# Patient Record
Sex: MALE | Race: BLACK OR AFRICAN AMERICAN | NOT HISPANIC OR LATINO | URBAN - METROPOLITAN AREA
[De-identification: names, ages, dates, MRNs, and addresses within clinical notes are randomized per-mention and may not be internally consistent; named-entity substitution may affect disease eponyms.]

---

## 2022-01-13 ENCOUNTER — WEB ENCOUNTER (OUTPATIENT)
Dept: URBAN - METROPOLITAN AREA CLINIC 82 | Facility: CLINIC | Age: 62
End: 2022-01-13

## 2022-01-13 ENCOUNTER — LAB OUTSIDE AN ENCOUNTER (OUTPATIENT)
Dept: URBAN - METROPOLITAN AREA CLINIC 82 | Facility: CLINIC | Age: 62
End: 2022-01-13

## 2022-01-13 ENCOUNTER — OFFICE VISIT (OUTPATIENT)
Dept: URBAN - METROPOLITAN AREA CLINIC 82 | Facility: CLINIC | Age: 62
End: 2022-01-13
Payer: MEDICARE

## 2022-01-13 VITALS
SYSTOLIC BLOOD PRESSURE: 149 MMHG | HEART RATE: 81 BPM | HEIGHT: 64 IN | DIASTOLIC BLOOD PRESSURE: 84 MMHG | TEMPERATURE: 97.4 F | WEIGHT: 150.8 LBS | BODY MASS INDEX: 25.74 KG/M2

## 2022-01-13 DIAGNOSIS — N18.6 ESRD (END STAGE RENAL DISEASE): ICD-10-CM

## 2022-01-13 DIAGNOSIS — K62.5 RECTAL BLEEDING: ICD-10-CM

## 2022-01-13 DIAGNOSIS — Z86.2 HISTORY OF IRON DEFICIENCY ANEMIA: ICD-10-CM

## 2022-01-13 PROBLEM — 46177005: Status: ACTIVE | Noted: 2022-01-13

## 2022-01-13 PROCEDURE — 99204 OFFICE O/P NEW MOD 45 MIN: CPT | Performed by: INTERNAL MEDICINE

## 2022-01-13 RX ORDER — CALCITRIOL 0.5 UG/1
TAKE 1 CAPSULE (0.5 MCG) BY ORAL ROUTE ONCE DAILY CAPSULE, LIQUID FILLED ORAL 1
Qty: 0 | Refills: 0 | Status: ACTIVE | COMMUNITY
Start: 1900-01-01

## 2022-01-13 RX ORDER — CLONIDINE HYDROCHLORIDE 0.2 MG/1
TAKE 1 TABLET (0.2 MG) BY ORAL ROUTE ONCE DAILY TABLET ORAL 1
Qty: 0 | Refills: 0 | Status: ACTIVE | COMMUNITY
Start: 1900-01-01

## 2022-01-13 RX ORDER — ASPIRIN 81 MG/1
TAKE 1 TABLET (81 MG) BY ORAL ROUTE ONCE DAILY TABLET, COATED ORAL 1
Qty: 0 | Refills: 0 | Status: ACTIVE | COMMUNITY
Start: 1900-01-01

## 2022-01-13 RX ORDER — NIFEDIPINE 90 MG/1
TAKE 1 TABLET (90 MG) BY ORAL ROUTE ONCE DAILY TABLET, EXTENDED RELEASE ORAL 1
Qty: 0 | Refills: 0 | Status: ACTIVE | COMMUNITY
Start: 1900-01-01

## 2022-01-13 RX ORDER — HYDRALAZINE HYDROCHLORIDE 100 MG/1
TABLET ORAL
Qty: 0 | Refills: 0 | Status: ACTIVE | COMMUNITY
Start: 1900-01-01

## 2022-01-13 RX ORDER — SODIUM BICARBONATE 650 MG/1
TABLET ORAL
Qty: 0 | Refills: 0 | Status: ACTIVE | COMMUNITY
Start: 1900-01-01

## 2022-01-13 RX ORDER — LOSARTAN POTASSIUM 50 MG/1
TAKE 1 TABLET (50 MG) BY ORAL ROUTE ONCE DAILY TABLET ORAL 1
Qty: 0 | Refills: 0 | Status: ACTIVE | COMMUNITY
Start: 1900-01-01

## 2022-01-13 RX ORDER — LABETALOL HYDROCHLORIDE 200 MG/1
TAKE 1 TABLET (200 MG) BY ORAL ROUTE 2 TIMES PER DAY TABLET, FILM COATED ORAL 2
Qty: 0 | Refills: 0 | Status: ACTIVE | COMMUNITY
Start: 1900-01-01

## 2022-01-13 NOTE — HPI-TODAY'S VISIT:
DEJUAN WADE IS HERS  PASSING BLOOD IN STOOL , FOR 1 WEEKS. CHRONIC CONSTIPATION  NO ABDOMINAL PAIN,   WT LOSS 5-6 LBS IN 2 MONTHS  WENT TO ER LAST MONTHS, GOT MEDS,   DID LABS AND CT SCAN. NOT SURE IF ANY ANEMAI  NO HEART OR LUNG ISSUE.   HAS KIDNEY ISSUE ON DIALYSIS.   SALOME CISSE

## 2022-01-13 NOTE — HPI-OTHER HISTORIES
RECENT ER VISIT FOR ACUTE HYPERKALEMIA FOR MISSING DIALYSIS, NO LABS, XRYA CHEST SHOWED PULM EDEMA;
1

## 2022-01-13 NOTE — PHYSICAL EXAM GASTROINTESTINAL
Abdomen , soft, nontender, nondistended , no guarding or rigidity , no masses palpable , normal bowel sounds , Liver and Spleen , no hepatomegaly present , no hepatosplenomegaly , liver nontender , spleen not palpable   ? TENDER IN ABDOMEN

## 2022-01-17 PROBLEM — 12063002: Status: ACTIVE | Noted: 2022-01-13

## 2022-01-17 PROBLEM — 161456009: Status: ACTIVE | Noted: 2022-01-13

## 2022-02-10 ENCOUNTER — OFFICE VISIT (OUTPATIENT)
Dept: URBAN - METROPOLITAN AREA MEDICAL CENTER 24 | Facility: MEDICAL CENTER | Age: 62
End: 2022-02-10

## 2022-02-10 ENCOUNTER — CLAIMS CREATED FROM THE CLAIM WINDOW (OUTPATIENT)
Dept: URBAN - METROPOLITAN AREA MEDICAL CENTER 24 | Facility: MEDICAL CENTER | Age: 62
End: 2022-02-10
Payer: MEDICARE

## 2022-02-10 DIAGNOSIS — K63.89 BACTERIAL OVERGROWTH SYNDROME: ICD-10-CM

## 2022-02-10 DIAGNOSIS — K62.5 ANAL BLEEDING: ICD-10-CM

## 2022-02-10 DIAGNOSIS — D50.9 ANEMIA: ICD-10-CM

## 2022-02-10 PROCEDURE — 45380 COLONOSCOPY AND BIOPSY: CPT | Performed by: INTERNAL MEDICINE

## 2022-02-10 RX ORDER — SODIUM BICARBONATE 650 MG/1
TABLET ORAL
Qty: 0 | Refills: 0 | Status: ACTIVE | COMMUNITY
Start: 1900-01-01

## 2022-02-10 RX ORDER — CALCITRIOL 0.5 UG/1
TAKE 1 CAPSULE (0.5 MCG) BY ORAL ROUTE ONCE DAILY CAPSULE, LIQUID FILLED ORAL 1
Qty: 0 | Refills: 0 | Status: ACTIVE | COMMUNITY
Start: 1900-01-01

## 2022-02-10 RX ORDER — LOSARTAN POTASSIUM 50 MG/1
TAKE 1 TABLET (50 MG) BY ORAL ROUTE ONCE DAILY TABLET ORAL 1
Qty: 0 | Refills: 0 | Status: ACTIVE | COMMUNITY
Start: 1900-01-01

## 2022-02-10 RX ORDER — HYDRALAZINE HYDROCHLORIDE 100 MG/1
TABLET ORAL
Qty: 0 | Refills: 0 | Status: ACTIVE | COMMUNITY
Start: 1900-01-01

## 2022-02-10 RX ORDER — NIFEDIPINE 90 MG/1
TAKE 1 TABLET (90 MG) BY ORAL ROUTE ONCE DAILY TABLET, EXTENDED RELEASE ORAL 1
Qty: 0 | Refills: 0 | Status: ACTIVE | COMMUNITY
Start: 1900-01-01

## 2022-02-10 RX ORDER — CLONIDINE HYDROCHLORIDE 0.2 MG/1
TAKE 1 TABLET (0.2 MG) BY ORAL ROUTE ONCE DAILY TABLET ORAL 1
Qty: 0 | Refills: 0 | Status: ACTIVE | COMMUNITY
Start: 1900-01-01

## 2022-02-10 RX ORDER — LABETALOL HYDROCHLORIDE 200 MG/1
TAKE 1 TABLET (200 MG) BY ORAL ROUTE 2 TIMES PER DAY TABLET, FILM COATED ORAL 2
Qty: 0 | Refills: 0 | Status: ACTIVE | COMMUNITY
Start: 1900-01-01

## 2022-02-10 RX ORDER — ASPIRIN 81 MG/1
TAKE 1 TABLET (81 MG) BY ORAL ROUTE ONCE DAILY TABLET, COATED ORAL 1
Qty: 0 | Refills: 0 | Status: ACTIVE | COMMUNITY
Start: 1900-01-01

## 2022-09-28 ENCOUNTER — OFFICE VISIT (OUTPATIENT)
Dept: URBAN - METROPOLITAN AREA CLINIC 82 | Facility: CLINIC | Age: 62
End: 2022-09-28

## 2022-09-28 RX ORDER — NIFEDIPINE 90 MG/1
TAKE 1 TABLET (90 MG) BY ORAL ROUTE ONCE DAILY TABLET, EXTENDED RELEASE ORAL 1
Qty: 0 | Refills: 0 | COMMUNITY
Start: 1900-01-01

## 2022-09-28 RX ORDER — LABETALOL HYDROCHLORIDE 200 MG/1
TAKE 1 TABLET (200 MG) BY ORAL ROUTE 2 TIMES PER DAY TABLET, FILM COATED ORAL 2
Qty: 0 | Refills: 0 | COMMUNITY
Start: 1900-01-01

## 2022-09-28 RX ORDER — CALCITRIOL 0.5 UG/1
TAKE 1 CAPSULE (0.5 MCG) BY ORAL ROUTE ONCE DAILY CAPSULE, LIQUID FILLED ORAL 1
Qty: 0 | Refills: 0 | COMMUNITY
Start: 1900-01-01

## 2022-09-28 RX ORDER — CLONIDINE HYDROCHLORIDE 0.2 MG/1
TAKE 1 TABLET (0.2 MG) BY ORAL ROUTE ONCE DAILY TABLET ORAL 1
Qty: 0 | Refills: 0 | COMMUNITY
Start: 1900-01-01

## 2022-09-28 RX ORDER — HYDRALAZINE HYDROCHLORIDE 100 MG/1
TABLET ORAL
Qty: 0 | Refills: 0 | COMMUNITY
Start: 1900-01-01

## 2022-09-28 RX ORDER — SODIUM BICARBONATE 650 MG/1
TABLET ORAL
Qty: 0 | Refills: 0 | COMMUNITY
Start: 1900-01-01

## 2022-09-28 RX ORDER — ASPIRIN 81 MG/1
TAKE 1 TABLET (81 MG) BY ORAL ROUTE ONCE DAILY TABLET, COATED ORAL 1
Qty: 0 | Refills: 0 | COMMUNITY
Start: 1900-01-01

## 2022-09-28 RX ORDER — LOSARTAN POTASSIUM 50 MG/1
TAKE 1 TABLET (50 MG) BY ORAL ROUTE ONCE DAILY TABLET ORAL 1
Qty: 0 | Refills: 0 | COMMUNITY
Start: 1900-01-01

## 2022-10-11 ENCOUNTER — OFFICE VISIT (OUTPATIENT)
Dept: URBAN - METROPOLITAN AREA CLINIC 82 | Facility: CLINIC | Age: 62
End: 2022-10-11

## 2022-10-11 RX ORDER — CLONIDINE HYDROCHLORIDE 0.2 MG/1
TAKE 1 TABLET (0.2 MG) BY ORAL ROUTE ONCE DAILY TABLET ORAL 1
Qty: 0 | Refills: 0 | COMMUNITY
Start: 1900-01-01

## 2022-10-11 RX ORDER — ASPIRIN 81 MG/1
TAKE 1 TABLET (81 MG) BY ORAL ROUTE ONCE DAILY TABLET, COATED ORAL 1
Qty: 0 | Refills: 0 | COMMUNITY
Start: 1900-01-01

## 2022-10-11 RX ORDER — LABETALOL HYDROCHLORIDE 200 MG/1
TAKE 1 TABLET (200 MG) BY ORAL ROUTE 2 TIMES PER DAY TABLET, FILM COATED ORAL 2
Qty: 0 | Refills: 0 | COMMUNITY
Start: 1900-01-01

## 2022-10-11 RX ORDER — CALCITRIOL 0.5 UG/1
TAKE 1 CAPSULE (0.5 MCG) BY ORAL ROUTE ONCE DAILY CAPSULE, LIQUID FILLED ORAL 1
Qty: 0 | Refills: 0 | COMMUNITY
Start: 1900-01-01

## 2022-10-11 RX ORDER — SODIUM BICARBONATE 650 MG/1
TABLET ORAL
Qty: 0 | Refills: 0 | COMMUNITY
Start: 1900-01-01

## 2022-10-11 RX ORDER — HYDRALAZINE HYDROCHLORIDE 100 MG/1
TABLET ORAL
Qty: 0 | Refills: 0 | COMMUNITY
Start: 1900-01-01

## 2022-10-11 RX ORDER — LOSARTAN POTASSIUM 50 MG/1
TAKE 1 TABLET (50 MG) BY ORAL ROUTE ONCE DAILY TABLET ORAL 1
Qty: 0 | Refills: 0 | COMMUNITY
Start: 1900-01-01

## 2022-10-11 RX ORDER — NIFEDIPINE 90 MG/1
TAKE 1 TABLET (90 MG) BY ORAL ROUTE ONCE DAILY TABLET, EXTENDED RELEASE ORAL 1
Qty: 0 | Refills: 0 | COMMUNITY
Start: 1900-01-01

## 2024-04-12 ENCOUNTER — LAB (OUTPATIENT)
Dept: URBAN - METROPOLITAN AREA CLINIC 82 | Facility: CLINIC | Age: 64
End: 2024-04-12

## 2024-04-12 ENCOUNTER — OV EP (OUTPATIENT)
Dept: URBAN - METROPOLITAN AREA CLINIC 82 | Facility: CLINIC | Age: 64
End: 2024-04-12
Payer: MEDICARE

## 2024-04-12 VITALS
SYSTOLIC BLOOD PRESSURE: 139 MMHG | DIASTOLIC BLOOD PRESSURE: 74 MMHG | HEIGHT: 64 IN | HEART RATE: 62 BPM | BODY MASS INDEX: 25.85 KG/M2 | TEMPERATURE: 97.2 F | WEIGHT: 151.4 LBS

## 2024-04-12 DIAGNOSIS — K59.09 CHRONIC CONSTIPATION: ICD-10-CM

## 2024-04-12 PROBLEM — 305058001: Status: ACTIVE | Noted: 2024-04-12

## 2024-04-12 PROBLEM — 236069009: Status: ACTIVE | Noted: 2024-04-12

## 2024-04-12 PROCEDURE — 99203 OFFICE O/P NEW LOW 30 MIN: CPT | Performed by: STUDENT IN AN ORGANIZED HEALTH CARE EDUCATION/TRAINING PROGRAM

## 2024-04-12 PROCEDURE — 99213 OFFICE O/P EST LOW 20 MIN: CPT | Performed by: STUDENT IN AN ORGANIZED HEALTH CARE EDUCATION/TRAINING PROGRAM

## 2024-04-12 RX ORDER — HYDRALAZINE HYDROCHLORIDE 100 MG/1
TABLET ORAL
Qty: 0 | Refills: 0 | COMMUNITY
Start: 1900-01-01

## 2024-04-12 RX ORDER — SODIUM BICARBONATE 650 MG/1
TABLET ORAL
Qty: 0 | Refills: 0 | COMMUNITY
Start: 1900-01-01

## 2024-04-12 RX ORDER — CALCITRIOL 0.5 UG/1
TAKE 1 CAPSULE (0.5 MCG) BY ORAL ROUTE ONCE DAILY CAPSULE, LIQUID FILLED ORAL 1
Qty: 0 | Refills: 0 | COMMUNITY
Start: 1900-01-01

## 2024-04-12 RX ORDER — LABETALOL HYDROCHLORIDE 200 MG/1
TAKE 1 TABLET (200 MG) BY ORAL ROUTE 2 TIMES PER DAY TABLET, FILM COATED ORAL 2
Qty: 0 | Refills: 0 | COMMUNITY
Start: 1900-01-01

## 2024-04-12 RX ORDER — NIFEDIPINE 90 MG/1
TAKE 1 TABLET (90 MG) BY ORAL ROUTE ONCE DAILY TABLET, EXTENDED RELEASE ORAL 1
Qty: 0 | Refills: 0 | COMMUNITY
Start: 1900-01-01

## 2024-04-12 RX ORDER — POLYETHYLENE GLYCOL-3350 AND ELECTROLYTES WITH FLAVOR PACK 240; 5.84; 2.98; 6.72; 22.72 G/278.26G; G/278.26G; G/278.26G; G/278.26G; G/278.26G
4000 ML POWDER, FOR SOLUTION ORAL 1
Qty: 1 | Refills: 0 | OUTPATIENT
Start: 2024-04-12 | End: 2024-04-13

## 2024-04-12 RX ORDER — CLONIDINE HYDROCHLORIDE 0.2 MG/1
TAKE 1 TABLET (0.2 MG) BY ORAL ROUTE ONCE DAILY TABLET ORAL 1
Qty: 0 | Refills: 0 | COMMUNITY
Start: 1900-01-01

## 2024-04-12 RX ORDER — LOSARTAN POTASSIUM 50 MG/1
TAKE 1 TABLET (50 MG) BY ORAL ROUTE ONCE DAILY TABLET ORAL 1
Qty: 0 | Refills: 0 | COMMUNITY
Start: 1900-01-01

## 2024-04-12 RX ORDER — ASPIRIN 81 MG/1
TAKE 1 TABLET (81 MG) BY ORAL ROUTE ONCE DAILY TABLET, COATED ORAL 1
Qty: 0 | Refills: 0 | COMMUNITY
Start: 1900-01-01

## 2024-04-12 NOTE — HPI-TODAY'S VISIT:
63 y.o male w PMH of ESRD s/p dialysis MWF, CVA s/p ASA 81mg presents today for a colon cancer screening visit, trying to get on the transplant list.  Pt denies any FHx of colon CA. Last colonoscopy in 2022 w Dr. Zuniga for ЕЛЕНА, rectal bleeds which noted large hemorrhoids only due to poor prep.   Currently asymptomatic at this visit, denies any GI symtoms. Denies any acid reflux,  abd pain, N/V/, changes in bowel habits, hematochezia, melena, weightloss, nocturnal symptoms. He is constipated, BM 1-2x per week. He does take otc therapies which does help.  Additionally, denies any recent cardiac or pulmonary diagnoses. Pt is trying to get on the kidney transplant list, deadline on 5/6/24, needs colonoscopy before then.

## 2024-04-12 NOTE — PHYSICAL EXAM CONSTITUTIONAL:
NAD, alert and oriented, well developed, well nourished, ambulating w the cane, slight right side paralysis

## 2024-04-26 ENCOUNTER — COLON (OUTPATIENT)
Dept: URBAN - METROPOLITAN AREA MEDICAL CENTER 26 | Facility: MEDICAL CENTER | Age: 64
End: 2024-04-26

## 2024-05-01 ENCOUNTER — OFFICE VISIT (OUTPATIENT)
Dept: URBAN - METROPOLITAN AREA MEDICAL CENTER 24 | Facility: MEDICAL CENTER | Age: 64
End: 2024-05-01
Payer: MEDICARE

## 2024-05-01 DIAGNOSIS — Z12.11 COLON CANCER SCREENING: ICD-10-CM

## 2024-05-01 PROCEDURE — G0121 COLON CA SCRN NOT HI RSK IND: HCPCS | Performed by: INTERNAL MEDICINE
